# Patient Record
Sex: FEMALE | Race: WHITE | URBAN - METROPOLITAN AREA
[De-identification: names, ages, dates, MRNs, and addresses within clinical notes are randomized per-mention and may not be internally consistent; named-entity substitution may affect disease eponyms.]

---

## 2017-02-20 ENCOUNTER — PRE VISIT (OUTPATIENT)
Dept: OTOLARYNGOLOGY | Facility: CLINIC | Age: 47
End: 2017-02-20

## 2017-02-20 NOTE — TELEPHONE ENCOUNTER
1.  Date/reason for appt:  3/07/17   Facial Swelling    2.  Referring provider:  [facility]    3.  Call to patient (Yes / No - short description):  No, transferred from [facility].  All records/imaging at [facility]    4.  Previous care at / records requested from:  [facility].

## 2017-02-21 NOTE — TELEPHONE ENCOUNTER
Records received from [facility].   Included:  Office notes- 12/28/16  Other- allergy testing

## 2017-03-02 NOTE — TELEPHONE ENCOUNTER
Neither [facility] nor [facility] has any head/neck imaging for patient.    Left [facility] [place] [facility] for pt to return call.    Was any imaging done; if so, where?

## 2017-03-02 NOTE — TELEPHONE ENCOUNTER
Pt insists CT was done by Dr Rohan MUNGUIA at [phone].    Called and requested report be faxed to [address] [place] [address].    Left [facility] [place] [facility], requested imaging be pushed to [facility].

## 2017-03-07 ENCOUNTER — OFFICE VISIT (OUTPATIENT)
Dept: OTOLARYNGOLOGY | Facility: CLINIC | Age: 47
End: 2017-03-07

## 2017-03-07 VITALS — HEIGHT: 67 IN | BODY MASS INDEX: 28.72 KG/M2 | WEIGHT: 183 LBS

## 2017-03-07 DIAGNOSIS — R22.0 FACIAL SWELLING: Primary | ICD-10-CM

## 2017-03-07 DIAGNOSIS — R22.0 FACIAL SWELLING: ICD-10-CM

## 2017-03-07 LAB
BASOPHILS # BLD AUTO: 0.1 10E9/L (ref 0–0.2)
BASOPHILS NFR BLD AUTO: 1.1 %
DIFFERENTIAL METHOD BLD: NORMAL
EOSINOPHIL # BLD AUTO: 0.1 10E9/L (ref 0–0.7)
EOSINOPHIL NFR BLD AUTO: 2.4 %
ERYTHROCYTE [DISTWIDTH] IN BLOOD BY AUTOMATED COUNT: 12.2 % (ref 10–15)
ERYTHROCYTE [SEDIMENTATION RATE] IN BLOOD BY WESTERGREN METHOD: 12 MM/H (ref 0–20)
HCT VFR BLD AUTO: 39.7 % (ref 35–47)
HGB BLD-MCNC: 12.9 G/DL (ref 11.7–15.7)
IMM GRANULOCYTES # BLD: 0 10E9/L (ref 0–0.4)
IMM GRANULOCYTES NFR BLD: 0.2 %
LYMPHOCYTES # BLD AUTO: 1.4 10E9/L (ref 0.8–5.3)
LYMPHOCYTES NFR BLD AUTO: 29.4 %
MCH RBC QN AUTO: 29.1 PG (ref 26.5–33)
MCHC RBC AUTO-ENTMCNC: 32.5 G/DL (ref 31.5–36.5)
MCV RBC AUTO: 89 FL (ref 78–100)
MONOCYTES # BLD AUTO: 0.4 10E9/L (ref 0–1.3)
MONOCYTES NFR BLD AUTO: 8 %
NEUTROPHILS # BLD AUTO: 2.7 10E9/L (ref 1.6–8.3)
NEUTROPHILS NFR BLD AUTO: 58.9 %
NRBC # BLD AUTO: 0 10*3/UL
NRBC BLD AUTO-RTO: 0 /100
PLATELET # BLD AUTO: 184 10E9/L (ref 150–450)
RBC # BLD AUTO: 4.44 10E12/L (ref 3.8–5.2)
WBC # BLD AUTO: 4.6 10E9/L (ref 4–11)

## 2017-03-07 RX ORDER — PREDNISONE 10 MG/1
TABLET ORAL
COMMUNITY
Start: 2016-12-28

## 2017-03-07 RX ORDER — ASPIRIN 81 MG/1
TABLET, CHEWABLE ORAL
COMMUNITY
Start: 2013-04-26 | End: 2017-03-07

## 2017-03-07 ASSESSMENT — PAIN SCALES - GENERAL: PAINLEVEL: NO PAIN (0)

## 2017-03-07 NOTE — NURSING NOTE
Chief Complaint   Patient presents with     Consult     swelling of right lower lip     Manoj Rojas LPN

## 2017-03-07 NOTE — LETTER
Date:April 17, 2017      Patient was self referred, no letter generated. Do not send.        [facility] Health Information

## 2017-03-07 NOTE — PROGRESS NOTES
HISTORY OF PRESENT ILLNESS:  The patient is 46 years of age.  She is here for the first time today.  She is a patient who for several months has had a history of diffuse intermittent expansile and contractile swelling of the lateral aspect of the orbicularis oris muscle and surrounding tissue on the right side.  This is not in the parotid gland.  It is in the pre-parotid fat in the circumferential area around the mouth.  It is not with pain, and it is not with infection.  There is a question as to whether or not this was a dental infection or something.  That has actually been verified not to be the case.  She is worried that this could somehow be associated with some kind of an allergic reaction to a gold crown and things of this nature and that was removed.  Medicines were given at the time of the removal and those medicines were discontinued and the swelling will still occur mainly on the right side of the face.  There is no other explanation for this.  No other diseases that occur in the family.  No autoimmune disease in the individual.  A 12-point review of systems on her is essentially negative throughout.  There are no other abnormalities that she notes with any other swelling of the body.  She did have an unusual embolism and she has some kind of an intravenous IVC filter or similar that is in place that is not MRI compatible, unfortunately.      ASSESSMENT AND PLAN:  Patient with a history of right facial swelling.  It is swollen today.  I can feel this diffuse swelling.  It is about 1-1.5 cm thick and tapered on bimanual examination of the cheek on that side.  I do not know if this could represent something highly unusual like a lymphangioma that is occurring as an adult or some kind of angiomatous lesion or maybe even something really strange like autoimmune panniculitis or something else of this nature.  Therefore, I am going to do a fairly extensive workup on her for lupus and things of this nature.   We are going to go ahead and see if there is any asymmetry with a simple ultrasound of the face on both sides, and we will go from there.      cc: Dany Madrigal MD    [address]   [address]       Last 2 Scores for Patient-Answered VHI Questionnaire  No flowsheet data found.    Last 2 Scores for Patient-Answered CSI Questionnaire  No flowsheet data found.      Last 2 Scores for Patient-Answered EAT Questionnaire  No flowsheet data found.        PAST MEDICAL HISTORY: No past medical history on file.    PAST SURGICAL HISTORY:   Past Surgical History   Procedure Laterality Date     Gyn surgery  2006     Hysterectomy 2006       FAMILY HISTORY:   Family History   Problem Relation Age of Onset     CANCER Paternal Grandmother      DIABETES Mother        SOCIAL HISTORY:   Social History   Substance Use Topics     Smoking status: Never Smoker     Smokeless tobacco: Never Used     Alcohol use No       REVIEW OF SYSTEMS: Ten point review of systems was performed and is negative except for:   [facility] ROS 2/27/2017   Allergy/Immunology Allergies or hay fever        ALLERGIES: Contrast dye; Coumadin; Heparin; Mold; and Warfarin    MEDICATIONS:   Current Outpatient Prescriptions   Medication Sig Dispense Refill     predniSONE (DELTASONE) 10 MG tablet 4 TABS DAILY X5 DAYS, 3 TABS DAILY X3 DAYS, 2 TABS DAILY X3 DAYS, THEN 1 TAB DAILY3 DAYS THEN OFF.           PHYSICAL EXAMINATION:  She  is awake, alert and in no apparent distress.    Her tympanic membranes are clear and intact bilaterally. External auditory canals are clear.  Nasal exam shows a mild septal deviation without obstruction.  Examination of the oral cavity shows no suspicious lesions.  There is symmetric movement of the tongue and soft palate.    The oropharynx is clear.  Her neck is supple without significant adenopathy.  Pulse is regular.  Upper airway is clear.  Cranial nerves II-XII are grossly intact.   Bimanual exam of the face  and skin reveal thickening about 3cm alomng the lateral aspect of the rbicularis orison the right and this is also a visible assymetry.There is NO discrete mass but this is above th region of the right facial lymph node.     A f

## 2017-03-07 NOTE — LETTER
3/7/2017       RE: Destiny Baugh  [address] [address]     Dear Colleague,    Thank you for referring your patient, Destiny Baugh, to the [facility] EAR NOSE AND THROAT at [facility]. Please see a copy of my visit note below.      HISTORY OF PRESENT ILLNESS:  The patient is 46 years of age.  She is here for the first time today.  She is a patient who for several months has had a history of diffuse intermittent expansile and contractile swelling of the lateral aspect of the orbicularis oris muscle and surrounding tissue on the right side.  This is not in the parotid gland.  It is in the pre-parotid fat in the circumferential area around the mouth.  It is not with pain, and it is not with infection.  There is a question as to whether or not this was a dental infection or something.  That has actually been verified not to be the case.  She is worried that this could somehow be associated with some kind of an allergic reaction to a gold crown and things of this nature and that was removed.  Medicines were given at the time of the removal and those medicines were discontinued and the swelling will still occur mainly on the right side of the face.  There is no other explanation for this.  No other diseases that occur in the family.  No autoimmune disease in the individual.  A 12-point review of systems on her is essentially negative throughout.  There are no other abnormalities that she notes with any other swelling of the body.  She did have an unusual embolism and she has some kind of an intravenous IVC filter or similar that is in place that is not MRI compatible, unfortunately.      ASSESSMENT AND PLAN:  Patient with a history of right facial swelling.  It is swollen today.  I can feel this diffuse swelling.  It is about 1-1.5 cm thick and tapered on bimanual examination of the cheek on that side.  I do not know if this could represent something highly unusual like a  lymphangioma that is occurring as an adult or some kind of angiomatous lesion or maybe even something really strange like autoimmune panniculitis or something else of this nature.  Therefore, I am going to do a fairly extensive workup on her for lupus and things of this nature.  We are going to go ahead and see if there is any asymmetry with a simple ultrasound of the face on both sides, and we will go from there.      cc: Dany Madrigal MD    [address]   [address]       Last 2 Scores for Patient-Answered VHI Questionnaire  No flowsheet data found.    Last 2 Scores for Patient-Answered CSI Questionnaire  No flowsheet data found.      Last 2 Scores for Patient-Answered EAT Questionnaire  No flowsheet data found.        PAST MEDICAL HISTORY: No past medical history on file.    PAST SURGICAL HISTORY:   Past Surgical History   Procedure Laterality Date     Gyn surgery  2006     Hysterectomy 2006       FAMILY HISTORY:   Family History   Problem Relation Age of Onset     CANCER Paternal Grandmother      DIABETES Mother        SOCIAL HISTORY:   Social History   Substance Use Topics     Smoking status: Never Smoker     Smokeless tobacco: Never Used     Alcohol use No       REVIEW OF SYSTEMS: Ten point review of systems was performed and is negative except for:   [facility] ROS 2/27/2017   Allergy/Immunology Allergies or hay fever        ALLERGIES: Contrast dye; Coumadin; Heparin; Mold; and Warfarin    MEDICATIONS:   Current Outpatient Prescriptions   Medication Sig Dispense Refill     predniSONE (DELTASONE) 10 MG tablet 4 TABS DAILY X5 DAYS, 3 TABS DAILY X3 DAYS, 2 TABS DAILY X3 DAYS, THEN 1 TAB DAILY3 DAYS THEN OFF.           PHYSICAL EXAMINATION:  She  is awake, alert and in no apparent distress.    Her tympanic membranes are clear and intact bilaterally. External auditory canals are clear.  Nasal exam shows a mild septal deviation without obstruction.  Examination of the oral cavity  shows no suspicious lesions.  There is symmetric movement of the tongue and soft palate.    The oropharynx is clear.  Her neck is supple without significant adenopathy.  Pulse is regular.  Upper airway is clear.  Cranial nerves II-XII are grossly intact.   Bimanual exam of the face and skin reveal thickening about 3cm alomng the lateral aspect of the rbicularis orison the right and this is also a visible assymetry.There is NO discrete mass but this is above th region of the right facial lymph node.     A f     Again, thank you for allowing me to participate in the care of your patient.      Sincerely,    Skyler Fletcher MD

## 2017-03-07 NOTE — PATIENT INSTRUCTIONS
The care plan for now will be to get an ultra sound of the area in question.  Also, we will get some labs drawn. Once the results are in and review with Dr Fletcher we will notify you and of the next steps.  Charles Conn ,RN  [phone]

## 2017-03-08 LAB
ANA SER QL IA: NORMAL
C3 SERPL-MCNC: 112 MG/DL (ref 76–169)
C4 SERPL-MCNC: 25 MG/DL (ref 15–50)
CRP SERPL HS-MCNC: 4.8 MG/L
ENA RNP IGG SER IA-ACNC: 0.2 AI (ref 0–0.9)
ENA SCL70 IGG SER IA-ACNC: NORMAL AI (ref 0–0.9)
ENA SM IGG SER-ACNC: NORMAL AI (ref 0–0.9)
ENA SS-A IGG SER IA-ACNC: NORMAL AI (ref 0–0.9)
ENA SS-B IGG SER IA-ACNC: 0.3 AI (ref 0–0.9)
RHEUMATOID FACT SER NEPH-ACNC: <20 IU/ML (ref 0–20)

## 2017-03-10 LAB — C1INH SERPL-MCNC: 33 MG/DL

## 2017-03-15 ENCOUNTER — TELEPHONE (OUTPATIENT)
Dept: OTOLARYNGOLOGY | Facility: CLINIC | Age: 47
End: 2017-03-15

## 2017-03-15 NOTE — TELEPHONE ENCOUNTER
The patient was called back with Dr Nieto' recommendation on how to move forward. As the ultra sound was essentially normal as well as the inflammatory labs (except CRP) Dr Fletcher is suggesting that Destiny consult with a rheumatologist to see if they have any ideas about the causes or the facial/lower lip swelling. She will try to get in with her primary clinic-[facility]. If she cannot she will call us back for a referral to one of our rheumatologist.

## 2018-01-21 ENCOUNTER — HEALTH MAINTENANCE LETTER (OUTPATIENT)
Age: 48
End: 2018-01-21

## 2020-03-10 ENCOUNTER — HEALTH MAINTENANCE LETTER (OUTPATIENT)
Age: 50
End: 2020-03-10

## 2020-12-27 ENCOUNTER — HEALTH MAINTENANCE LETTER (OUTPATIENT)
Age: 50
End: 2020-12-27

## 2021-03-06 ENCOUNTER — HEALTH MAINTENANCE LETTER (OUTPATIENT)
Age: 51
End: 2021-03-06

## 2021-04-24 ENCOUNTER — HEALTH MAINTENANCE LETTER (OUTPATIENT)
Age: 51
End: 2021-04-24

## 2021-10-09 ENCOUNTER — HEALTH MAINTENANCE LETTER (OUTPATIENT)
Age: 51
End: 2021-10-09

## 2022-03-26 ENCOUNTER — HEALTH MAINTENANCE LETTER (OUTPATIENT)
Age: 52
End: 2022-03-26

## 2022-05-21 ENCOUNTER — HEALTH MAINTENANCE LETTER (OUTPATIENT)
Age: 52
End: 2022-05-21

## 2022-06-16 NOTE — MR AVS SNAPSHOT
After Visit Summary   3/7/2017    Destiny Baugh    MRN: [id number]           Patient Information     Date Of Birth          1970        Visit Information        Provider Department      3/7/2017 2:30 PM Skyler Fletcher MD [facility] Ear Nose and Throat        Today's Diagnoses     Facial swelling    -  1      Care Instructions    The care plan for now will be to get an ultra sound of the area in question.  Also, we will get some labs drawn. Once the results are in and review with Dr Fletcher we will notify you and of the next steps.  Charles Conn ,RN  [phone]            Follow-ups after your visit        Your next 10 appointments already scheduled     Mar 07, 2017  3:45 PM CST   LAB with  LAB   [facility] ([facility])    [address] [place] [id number] [phone]           Patient must bring picture ID.  Patient should be prepared to give a urine specimen  Please do not eat 10-12 hours before your appointment if you are coming in fasting for labs on lipids, cholesterol, or glucose (sugar).  Pregnant women should follow their Care Team instructions. Water with medications is okay. Do not drink coffee or other fluids.   If you have concerns about taking  your medications, please ask at office or if scheduling via [employer], send a message by clicking on Secure Messaging, Message Your Care Team.            Mar 07, 2017  6:30 PM CST   US HEAD NECK SOFT TISSUE with UCUS3   [facility] ([facility])    [address] [place] [id number] [phone]           Please bring a list of your medicines (including vitamins, minerals and over-the-counter drugs). Also, tell your doctor about any allergies you may have. Wear comfortable clothes and leave your valuables at home.  You do not need to do anything special to prepare for your exam.  Please call the Imaging Department  at your exam site with any questions.              Future tests that were ordered for you today     Open Future Orders        Priority Expected Expires Ordered    US Head Neck Soft Tissue Routine 6/5/2017 3/7/2018 3/7/2017    DELPHINE antibody panel Routine  3/8/2018 3/7/2017    Antinuclear antibody screen by EIA Routine  3/7/2018 3/7/2017    Rheumatoid factor Routine  3/7/2018 3/7/2017    C-Reactive Protein  Cardiac ([facility]) Routine  3/7/2018 3/7/2017    Erythrocyte sedimentation rate auto Routine  3/7/2018 3/7/2017    Complement C4 Routine  3/8/2018 3/7/2017    Complement C3 Routine  3/8/2018 3/7/2017    C1 Esterase inhibitor total Routine  3/8/2018 3/7/2017    CBC with platelets differential Routine  3/7/2018 3/7/2017            Who to contact     Please call your clinic at [phone] to:    Ask questions about your health    Make or cancel appointments    Discuss your medicines    Learn about your test results    Speak to your doctor   If you have compliments or concerns about an experience at your clinic, or if you wish to file a complaint, please contact [facility] Patient Relations at [phone] or email us at [email or web address]         Additional Information About Your Visit        [employer] Information     [employer] gives you secure access to your electronic health record. If you see a primary care provider, you can also send messages to your care team and make appointments. If you have questions, please call your primary care clinic.  If you do not have a primary care provider, please call [phone] and they will assist you.      [employer] is an electronic gateway that provides easy, online access to your medical records. With [employer], you can request a clinic appointment, read your test results, renew a prescription or communicate with your care team.     To access your existing account, please contact your [facility] or call [phone]  "for assistance.        Care EveryWhere ID     This is your Care EveryWhere ID. This could be used by other organizations to access your [place] medical records  [id number]        Your Vitals Were     Height BMI (Body Mass Index)                1.7 m (5' 6.93\") 28.72 kg/m2           Blood Pressure from Last 3 Encounters:   No data found for BP    Weight from Last 3 Encounters:   03/07/17 83 kg (183 lb)               Primary Care Provider    None Specified       No primary provider on file.        Thank you!     Thank you for choosing [facility] EAR NOSE AND THROAT  for your care. Our goal is always to provide you with excellent care. Hearing back from our patients is one way we can continue to improve our services. Please take a few minutes to complete the written survey that you may receive in the mail after your visit with us. Thank you!             Your Updated Medication List - Protect others around you: Learn how to safely use, store and throw away your medicines at www.disposemymeds.org.          This list is accurate as of: 3/7/17  3:44 PM.  Always use your most recent med list.                   Brand Name Dispense Instructions for use    predniSONE 10 MG tablet    DELTASONE     4 TABS DAILY X5 DAYS, 3 TABS DAILY X3 DAYS, 2 TABS DAILY X3 DAYS, THEN 1 TAB DAILY3 DAYS THEN OFF.         " clears

## 2022-09-11 ENCOUNTER — HEALTH MAINTENANCE LETTER (OUTPATIENT)
Age: 52
End: 2022-09-11

## 2023-05-06 ENCOUNTER — HEALTH MAINTENANCE LETTER (OUTPATIENT)
Age: 53
End: 2023-05-06

## 2023-06-03 ENCOUNTER — HEALTH MAINTENANCE LETTER (OUTPATIENT)
Age: 53
End: 2023-06-03